# Patient Record
Sex: FEMALE | URBAN - METROPOLITAN AREA
[De-identification: names, ages, dates, MRNs, and addresses within clinical notes are randomized per-mention and may not be internally consistent; named-entity substitution may affect disease eponyms.]

---

## 2017-11-21 ENCOUNTER — TELEPHONE (OUTPATIENT)
Dept: TRANSPLANT | Facility: CLINIC | Age: 57
End: 2017-11-21

## 2017-11-21 DIAGNOSIS — I50.9 CONGESTIVE HEART FAILURE, UNSPECIFIED CONGESTIVE HEART FAILURE CHRONICITY, UNSPECIFIED CONGESTIVE HEART FAILURE TYPE: Primary | ICD-10-CM

## 2017-11-21 NOTE — TELEPHONE ENCOUNTER
"REFERRAL NOTE:    Laura Manuel has been referred to the pre-heart transplant office for consideration for orthotopic heart transplantation by Dr. Shannon. Patient's appointments have been scheduled for 12/06/17 as per patients request due to allowing time to set up travel arrangements.  Information was provided and questions were answered.  Copies of "What to Expect", appointment [place] and [place] [facility] [facility] [facility] were mailed to the patient. Pleasant. My contact information given. Call PRN.  "

## 2017-11-21 NOTE — TELEPHONE ENCOUNTER
Spoke to Brisa at Dr. Shannon's office ([phone]). Informed of patients appointment date and time with Dr. TAMIA Lyles. Call PRN.

## 2017-12-01 ENCOUNTER — TELEPHONE (OUTPATIENT)
Dept: TRANSPLANT | Facility: CLINIC | Age: 57
End: 2017-12-01

## 2017-12-01 NOTE — TELEPHONE ENCOUNTER
Spoke with patient. Please bring medicine list, insurance information to appointment. If have imaging or can get it, bring as well. Not having imaging will not stop patient from being evaluated. Pleasant. Call PRN.

## 2017-12-06 ENCOUNTER — HOSPITAL ENCOUNTER (OUTPATIENT)
Dept: CARDIOLOGY | Facility: CLINIC | Age: 57
Discharge: HOME | End: 2017-12-06
Payer: COMMERCIAL

## 2017-12-06 ENCOUNTER — INITIAL CONSULT (OUTPATIENT)
Dept: TRANSPLANT | Facility: CLINIC | Age: 57
End: 2017-12-06
Payer: COMMERCIAL

## 2017-12-06 VITALS
HEIGHT: 66 IN | HEART RATE: 78 BPM | DIASTOLIC BLOOD PRESSURE: 60 MMHG | BODY MASS INDEX: 29.62 KG/M2 | SYSTOLIC BLOOD PRESSURE: 131 MMHG | WEIGHT: 184.31 LBS

## 2017-12-06 DIAGNOSIS — I42.0 DILATED CARDIOMYOPATHY: ICD-10-CM

## 2017-12-06 DIAGNOSIS — I50.22 CHRONIC SYSTOLIC HEART FAILURE: ICD-10-CM

## 2017-12-06 DIAGNOSIS — I10 ESSENTIAL HYPERTENSION: ICD-10-CM

## 2017-12-06 DIAGNOSIS — I44.7 LBBB (LEFT BUNDLE BRANCH BLOCK): ICD-10-CM

## 2017-12-06 PROCEDURE — 99204 OFFICE O/P NEW MOD 45 MIN: CPT | Mod: TXP,,, | Performed by: INTERNAL MEDICINE

## 2017-12-06 PROCEDURE — 93000 ELECTROCARDIOGRAM COMPLETE: CPT | Mod: NTX,S$GLB,, | Performed by: INTERNAL MEDICINE

## 2017-12-06 PROCEDURE — 99999 PR PBB SHADOW E&M-EST. PATIENT-LVL III: CPT | Mod: PBBFAC,TXP,, | Performed by: INTERNAL MEDICINE

## 2017-12-06 RX ORDER — FUROSEMIDE 80 MG/1
80 TABLET ORAL DAILY
COMMUNITY
Start: 2017-11-20

## 2017-12-06 RX ORDER — SPIRONOLACTONE 25 MG/1
12.5 TABLET ORAL DAILY PRN
COMMUNITY
Start: 2017-11-20

## 2017-12-06 RX ORDER — ASPIRIN 81 MG/1
81 TABLET ORAL DAILY
COMMUNITY

## 2017-12-06 RX ORDER — POTASSIUM CHLORIDE 750 MG/1
10 TABLET, EXTENDED RELEASE ORAL 2 TIMES DAILY
COMMUNITY
Start: 2017-11-20 | End: 2018-11-20

## 2017-12-06 RX ORDER — LOSARTAN POTASSIUM 25 MG/1
25 TABLET ORAL DAILY
COMMUNITY
Start: 2017-11-20

## 2017-12-06 RX ORDER — CARVEDILOL 6.25 MG/1
6.25 TABLET ORAL 2 TIMES DAILY WITH MEALS
COMMUNITY
Start: 2017-11-20

## 2017-12-06 NOTE — LETTER
December 6, 2017        Milind Shannon  [address]  [place] [address]  Phone: [phone]  Fax: [phone]             [facility] [address]  [facility] [address]  Phone: [phone]   Patient: Trista Manuel   MR Number: [id number]   YOB: 1960   Date of Visit: 12/6/2017       Dear Dr. Milind Shannon    Thank you for referring Trista Manuel to me for evaluation. Attached you will find relevant portions of my assessment and plan of care.    If you have questions, please do not hesitate to call me. I look forward to following Trista Manuel along with you.    Sincerely,    Geo Lyles MD    Enclosure    If you would like to receive this communication electronically, please contact [email or web address] or [phone] to request Namshi Link access.    Namshi Link is a tool which provides read-only access to select patient information with whom you have a relationship. Its easy to use and provides real time access to review your patients record including encounter summaries, notes, results, and demographic information.    If you feel you have received this communication in error or would no longer like to receive these types of communications, please e-mail [email or web address]

## 2017-12-06 NOTE — PROGRESS NOTES
"Subjective:   Initial evaluation of heart transplant candidacy.    HPI:  Ms. Manuel is a 57 y.o. year old female who has presented to be considered for advanced surgical options (LVAD/OHT).      Patient reports being diagnosed with first acute heart failure in 2016 when admitted to [facility]. Was made later to follow with cardiologist, Dr. Shannon. Had total of 2 admissions in 2016. Had a LHC that showed normal coronary arteries. Stress echo revealed dilated NICM. Was started on medical therapy in  but reports non-adherence until being discharged for total 6th admission for heart failure exacerbation in 2017. Since then she has had no flare-ups. Currently reports WESLEY with minimal exertion, palpitations, 2-pillow orthopnea but denies any CP, lower ext swelling, PND, or syncope. Reports chest-tightness when SOB sometimes. Avoids salt in her diet but might be non-adherent at times. Reports eating healthy diet with intention to loose weight.   Patient has a life-vest currently.     Most recent echo in 2017 revealed EF of 10-15% with dilated severe global hypokinesias. EKG reportedly revealed LBBB. Cr baseline of 0.7.       PMHx: HTN diagnosed  with hx of non-adherence, PSHx: lap molly 2017, hysterectomy due to fibroids .  Allergies: no drug allergies, has seasonal allergies.  Medications: ASA 81mg daily, losartan 25mg daily, coreg 6.25mg bid, lasix 80mg daily, aldactone 12.5mg od, and KCl 10meq bid.  FHx: both parents with CHF, dad  from [place], older sister with "heart problems". Mom had HTN and DM II.   Shx: worked in [employer] before going on to disability due to her CHF. Hx of marijuana, no hx of heavy EtOH.      Past Medical History:   Diagnosis Date    Cardiomyopathy     CHF (congestive heart failure)      Past Surgical History:   Procedure Laterality Date    CHOLECYSTECTOMY      HYSTERECTOMY         No family history on file.    ROS  Constitutional: no fever or chills  ENT: no nasal " "congestion or sore throat  Respiratory: no cough or shortness of breath  Cardiovascular: no chest pain or palpitations  Gastrointestinal: no nausea or vomiting, no abdominal pain or abdominal distention   Genitourinary: no hematuria or dysuria  Integument/Breast: no rash or pruritis  Hematologic/Lymphatic: no easy bruising or lymphadenopathy  Musculoskeletal: no arthralgias or myalgias  Neurological: no seizures or tremors  Endocrine: no heat or cold intolerance      Objective:   Blood pressure 131/60, pulse 78, height 5' 6" (1.676 m), weight 83.6 kg (184 lb 4.9 oz).body mass index is 29.75 kg/m².    Physical Exam    General: well developed, well nourished, appears to be in NAD  Eyes: conjunctivae/corneas clear. PERRL. EOMI.  Neck: supple, symmetrical, trachea midline, no JVD  Cardiovascular: Heart: regular rate and rhythm, S1, S2 normal, no click, rub or gallop.  Lungs: mild inspiratory crackles, normal respiratory effort.  Chest Wall: no tenderness  Abdomen/Rectal: Abdomen: Non distended. + BS. No masses. No TTP.   Extremities: no edema, redness or tenderness in the calves or thighs. Pulses: 2+ and symmetric  Skin: Skin color, texture, turgor normal. No rashes or lesions  Musculoskeletal: full range of motion of joints  Lymph Nodes: No cervical or supraclavicular adenopathy  Psych/Behavioral: Normal. Alert and oriented, appropriate affect.    Labs:      Chemistry        Component Value Date/Time     (L) 12/06/2017 0937    K 4.0 12/06/2017 0937     12/06/2017 0937    CO2 24 12/06/2017 0937    BUN 17 12/06/2017 0937    CREATININE 1.1 12/06/2017 0937     (H) 12/06/2017 0937        Component Value Date/Time    CALCIUM 10.1 12/06/2017 0937    ALKPHOS 132 12/06/2017 0937    AST 14 12/06/2017 0937    ALT 19 12/06/2017 0937    BILITOT 0.9 12/06/2017 0937    ESTGFRAFRICA >60.0 12/06/2017 0937    EGFRNONAA 55.5 (A) 12/06/2017 0937          No results found for: MG  Lab Results   Component Value Date    " WBC 6.95 12/06/2017    HGB 11.5 (L) 12/06/2017    HCT 32.9 (L) 12/06/2017    MCV 87 12/06/2017     12/06/2017     BNP   Date Value Ref Range Status   12/06/2017 140 (H) 0 - 99 pg/mL Final     Comment:     Values of less than 100 pg/ml are consistent with non-CHF populations.     No results found for this or any previous visit.    Labs were reviewed with the patient.    Assessment:      1. Chronic systolic heart failure    2. Dilated cardiomyopathy    3. LBBB (left bundle branch block)    4. Essential hypertension        Plan:       Patient is now NYHA III ACC stage D  Recommend 2 gram sodium restriction and 1500cc fluid restriction.  Encourage physical activity with graded exercise program.  Requested patient to weigh themselves daily, and to notify us if their weight increases by more than 3 lbs in 1 day or 5 lbs in 1 week.     Transplant Candidacy: Patient is a 57 y.o. year old female with heart failure is being seen for possible OHT. In our opinion, this patient has LBBB on EKG and medical non-adherence and thus will allow for patient a trial of strict medical therapy and recommend CRT-D prior to considering heart transplant workup. Will discuss plan with patient's primary cardiologist Dr. Milind Shannon.      Patient did not meet with MCS and/or pre-transplant coordinator at the end of this visit for workup. she is not scheduled for risk stratification testing.    UNOS Patient Status  Functional Status: 70% - Cares for self: unable to carry on normal activity or active work  Physical Capacity: No Limitations  Working for Income: No  If no, reason not working: Disability    Leonor Gutiérrez MD         Case and plan d/w Dr. Lyles.

## 2017-12-12 DIAGNOSIS — Z76.82 ORGAN TRANSPLANT CANDIDATE: Primary | ICD-10-CM

## 2018-01-08 DIAGNOSIS — I42.0 DILATED CARDIOMYOPATHY: Primary | ICD-10-CM

## 2018-01-10 ENCOUNTER — INITIAL CONSULT (OUTPATIENT)
Dept: ELECTROPHYSIOLOGY | Facility: CLINIC | Age: 58
End: 2018-01-10
Payer: COMMERCIAL

## 2018-01-10 ENCOUNTER — HOSPITAL ENCOUNTER (OUTPATIENT)
Dept: CARDIOLOGY | Facility: CLINIC | Age: 58
Discharge: HOME | End: 2018-01-10
Payer: COMMERCIAL

## 2018-01-10 VITALS
WEIGHT: 183 LBS | SYSTOLIC BLOOD PRESSURE: 122 MMHG | BODY MASS INDEX: 29.41 KG/M2 | DIASTOLIC BLOOD PRESSURE: 76 MMHG | HEIGHT: 66 IN | HEART RATE: 74 BPM

## 2018-01-10 DIAGNOSIS — I50.22 CHRONIC SYSTOLIC HEART FAILURE: ICD-10-CM

## 2018-01-10 DIAGNOSIS — I42.0 DILATED CARDIOMYOPATHY: ICD-10-CM

## 2018-01-10 DIAGNOSIS — I10 ESSENTIAL HYPERTENSION: ICD-10-CM

## 2018-01-10 DIAGNOSIS — I42.0 DILATED CARDIOMYOPATHY: Primary | ICD-10-CM

## 2018-01-10 DIAGNOSIS — I44.7 LBBB (LEFT BUNDLE BRANCH BLOCK): Primary | ICD-10-CM

## 2018-01-10 PROCEDURE — 93000 ELECTROCARDIOGRAM COMPLETE: CPT | Mod: NTX,S$GLB,, | Performed by: INTERNAL MEDICINE

## 2018-01-10 PROCEDURE — 99999 PR PBB SHADOW E&M-EST. PATIENT-LVL III: CPT | Mod: PBBFAC,TXP,, | Performed by: INTERNAL MEDICINE

## 2018-01-10 PROCEDURE — 99205 OFFICE O/P NEW HI 60 MIN: CPT | Mod: NTX,S$GLB,, | Performed by: INTERNAL MEDICINE

## 2018-01-10 NOTE — PROGRESS NOTES
Subjective:    Patient ID:  Trista Manuel is a 57 y.o. female who presents for evaluation of Cardiomyopathy      HPI   57 y.o. F  NICM, cath-proved  HTN    CM first dx'd 9/2016. LHC showed normal cors.  Subsequent echos in 2/17 and 8/17: 10-15% LVEF.  Does get palps on occasion. No syncope ever. Wearing LifeVest; no shock from that.  NYHA II-III WESLEY. No CP.    My interpretation of today's ECG is NSR with LBBB    Review of Systems   Constitution: Negative. Negative for weakness and malaise/fatigue.   HENT: Negative.  Negative for ear pain and tinnitus.    Eyes: Negative for blurred vision.   Cardiovascular: Positive for dyspnea on exertion and palpitations. Negative for chest pain, near-syncope and syncope.   Respiratory: Negative.  Negative for shortness of breath.    Endocrine: Negative.  Negative for polyuria.   Hematologic/Lymphatic: Does not bruise/bleed easily.   Skin: Negative.  Negative for rash.   Musculoskeletal: Negative.  Negative for joint pain and muscle weakness.   Gastrointestinal: Negative.  Negative for abdominal pain and change in bowel habit.   Genitourinary: Negative for frequency.   Neurological: Negative.  Negative for dizziness.   Psychiatric/Behavioral: Negative.  Negative for depression. The patient is not nervous/anxious.    Allergic/Immunologic: Negative for environmental allergies.        Objective:    Physical Exam   Constitutional: She is oriented to person, place, and time. Vital signs are normal. She appears well-developed and well-nourished. She is active and cooperative.   HENT:   Head: Normocephalic and atraumatic.   Eyes: Conjunctivae and EOM are normal.   Neck: Normal range of motion. Carotid bruit is not present. No tracheal deviation and no edema present. No thyroid mass and no thyromegaly present.   Cardiovascular: Normal rate, regular rhythm, normal heart sounds, intact distal pulses and normal pulses.   No extrasystoles are present. PMI is not displaced.  Exam reveals no  gallop and no friction rub.    No murmur heard.  Pulmonary/Chest: Effort normal and breath sounds normal. No respiratory distress. She has no wheezes. She has no rales.   Abdominal: Soft. Normal appearance. She exhibits no distension. There is no hepatosplenomegaly.   Musculoskeletal: Normal range of motion.   Neurological: She is alert and oriented to person, place, and time. Coordination normal.   Skin: Skin is warm and dry. No rash noted.   Psychiatric: She has a normal mood and affect. Her speech is normal and behavior is normal. Thought content normal. Cognition and memory are normal.   Nursing note and vitals reviewed.        Assessment:       1. LBBB (left bundle branch block)    2. Essential hypertension    3. Dilated cardiomyopathy    4. Chronic systolic heart failure         Plan:       Given NICM with LBBB and depressed LVEF and NYHA II-III (more III) sx, makes criteria for biV ICD.  I spent about a half hour discussing the risks and benefits of ICD/CRT-D placement. Our discussion of risks included (but was not limited to) the possibility of infection, death, stroke, MI, pneumothorax, embolism, cardiac perforation, cardiac tamponade, renal injury, and bleeding.  I discussed with patient risks, indications, benefits, and alternatives of the planned procedure. All questions were answered. Patient understands and wishes to proceed.    Will screen for BTK QP Excels trial. If screens in, then BTK biV ICD. If not eligible for that study, then SJM biV ICD.  Anesthesia for MAC.

## 2018-01-10 NOTE — LETTER
January 10, 2018      Geo Lyles MD  [address]  [facility] [address]           [facility]  [address]  [facility] [address]  Phone: [phone]  Fax: [phone]          Patient: Trista Manuel   MR Number: [id number]   YOB: 1960   Date of Visit: 1/10/2018       Dear Dr. Geo Lyles:    Thank you for referring Trista Manuel to me for evaluation. Attached you will find relevant portions of my assessment and plan of care.    If you have questions, please do not hesitate to call me. I look forward to following Trista aMnuel along with you.    Sincerely,    Walt Allen MD    Enclosure  CC:  No Recipients    If you would like to receive this communication electronically, please contact [email or web address] or [phone] to request more information on iPling Link access.    For providers and/or their staff who would like to refer a patient to [facility], please contact us through our one-stop-shop provider referral line, [facility], at [phone].    If you feel you have received this communication in error or would no longer like to receive these types of communications, please e-mail [email or web address]

## 2018-01-10 NOTE — PROGRESS NOTES
Post-Procedure Patient Discharge Instructions  Defibrillator  Wound Care   If you are discharged with a standard dressing over the incision, you may remove the dressing after 24 hours.    If you are discharged with an AQUACEL dressing, you should keep it on until your follow-up appointment in 1-2 weeks.   If there are Steri-strips (strips of tape) over your incision, leave them on until your follow-up appointment. They may begin to fall off on their own, which is normal. If there is Dermabond (clear glue) over your incision, do not scrub it off. It acts as a barrier and will eventually disappear.   You will be discharged with 5 days of oral antibiotics. Please take the full prescription until it is gone.   Do not get the incision wet for 48 hours following the procedure. You may sponge bath during this period, working around the incision. After 48 hours, you may shower, but you should still try to keep this area as dry as possible, and avoid direct water contact to the incision (allow the water to hit back of your shoulder rather than directly on the incision). Gently pat the incision dry if it does get wet.   You may take regular showers after 2 weeks, unless otherwise indicated at your follow-up visit.   Do not submerge the incision in a tub, pool, or body of water for 6 weeks.   Avoid using deodorants, powders, creams, lotions, etc. on your incision for 6 weeks.   If you notice unusual swelling, redness, drainage, have more device site pain, chest pain, shortness of breath, or have a fever greater than 100 degrees, call our device clinic immediately: [phone] or [phone] during normal office hours. You may call [phone] after-hours or on weekends and ask for the electrophysiologist on call.  Activity    If you only had a battery/generator change performed, there are no postoperative activity restrictions.    If this is your first device or if you had new wires added to your existing  device, then you will be discharged in a sling which you should wear continuously for 48 hours. After that, the sling should remain off during the day but should be worn at night for another 2-4 weeks (depending on how active a sleeper you are).   Do not raise your device-side arm above your shoulder for 6 weeks. Do not lift more than 5-10 lbs with your device-side arm for 6 weeks.    If you were driving prior to the procedure, you may resume driving after your first follow-up appointment (1-2 weeks). If you have a history of passing out or a history of certain arrhythmias, there may be driving restrictions unrelated to the procedure. Please clarify with your physician if this is the case.   No heavy activity with the affected arm for 6 weeks (eg. tennis, golf, bowling, aerobics, mowing the lawn, etc.).   Avoid rough contact at the device site for 6 weeks.   You may participate in sexual activity unless otherwise instructed.   You may return to work within 3-5 days unless told otherwise, provided you adhere to the above activity restrictions.  Long-Term Instructions   Keep your pacemaker or defibrillator identification card with you at all times.   If you have a defibrillator and you get shocked by the device: If you receive one shock and you feel ok, you may call [phone] or [phone] during normal office hours. You may call [phone] after-hours. If you receive one shock and you do not feel well, call Emergency Medical Services. They will take you to the nearest emergency room.   If you have a defibrillator and you get more than one shock from the device or multiple shocks in a short period of time: Call Emergency Medical Services. They will take you to the nearest emergency room.   Appliances: You may operate any electrical device in your home, including microwaves.   Security Systems: Electromagnetic security systems can be located in the workplace, courthouses, or other  high-security areas. Exposure to this type of security system has been shown to cause interference in some cases. Interference may be related to the duration of exposure and/or the distance between the device and the security device. You should be aware of the location of security systems, move through them at a normal pace, and avoid leaning or standing too close.   Metal Detectors at Airports: Metal detectors at airports can potentially interfere with pacemakers or defibrillators, although this is unlikely. Metal detectors will likely be triggered by your device and therefore at places such as airports  it will be important for you to carry your identification card for the pacemaker/defibrillator. Airport personnel will likely prefer to do a manual search.   Cellular Phones: It is unlikely that using a cellular phone will interfere with your device. It should be used with the hand opposite to the side where your device was implanted. The phone should not be carried in the shirt pocket on the same side as the device.   Specific Work Conditions: Patients who work near high-voltage lines, transmitting towers, large motors, welding equipment, or powerful magnets should discuss their specific situation with their physician. In general, remain at least two feet from external electrical equipment, verify that the equipment is properly grounded, and wear insulated gloves when using electrical devices. Leave the immediate location if lightheadedness or other symptoms develop.   MRI: Some pacemakers and defibrillators are safe in MRI scanners, while others are not. Please consult with your physician to see if you have an MRI-compatible device.   Surgery: Should you require surgery in the future, some electrosurgical devices can interfere with your device function. You should discuss this with your surgeon before any operation.   Radiation Therapy: If you ever require radiation therapy in the future, care must be taken  to avoid irradiating the device.  Long-Term Follow-Up   Your device has the ability to transmit device information from home to the doctors office using a home monitoring system.   This remote system takes the place of a doctors visit. Your device will be checked from home every 3-6 months. Every 6-12 months, you will be asked to come into the office for an in-office check.   Your device should last in the range of 6-12 years. This depends on many factors including how often it paces the heart.   When the battery is low, a generator change will be performed. This is a same-day procedure with no post-op activity restrictions, unless one of the pacemaker or defibrillator leads needs to be replaced at that time, or a new lead is added to your existing system.

## 2018-01-10 NOTE — PROGRESS NOTES
IMPLANTABLE DEVICE EDUCATION CHECKLIST    1-10-18 - lab work  PRE - PROCEDURE LABS HAVE BEEN ORDERED FOR YOU @ [facility]  BE SURE TO ARRIVE AT YOUR SCHEDULED TIME FOR THIS LAB WORK!  (YOU DO NOT HAVE TO FAST FOR THIS LABWORK!!!!)    1-16-18 @ 6 AM - DEFIBRILLATOR  REPORT TO CARDIOLOGY WAITING ROOM ON 3RD FLOOR OF HOSPITAL (DO NOT REPORT TO CLINIC)  Directions for Reporting to Cardiology Waiting Area in the Hospital  If you park in the Parking Garage:  Take elevators to the 2nd floor  Walk up ramp and turn right by Gold Elevators  Take elevator to the 3rd floor  Upon exiting the elevator, turn away from the clinic areas  Walk long peralta around to front of hospital to area with windows overlooking [facility]  Check in at Reception Desk  OR  If family is dropping you off:  Have them drop you off at the front of the Hospital  (Near the ER, where all the flags are hung).  Take the E elevators to the 3rd floor.  Check in at the Reception Desk in the waiting room.        WASH YOUR CHEST WITH HIBICLENS OR AN ANTIBACTERIAL SOAP (SUCH AS DIAL) ON THE NIGHT BEFORE AND THE MORNING OF YOUR PROCEDURE.    DO NOT EAT OR DRINK ANYTHING AFTER: 12 MN ON THE NIGHT BEFORE YOUR PROCEDURE    MEDICATIONS  MORNING OF PROCEDURE DO NOT TAKE LASIX(FUROSEMIDE) OR ALDACTONE(SPIRONOLACTONE)  YOU MAY TAKE ALL OTHER  MORNING MEDICATIONS WITH A SIP OF WATER    YOU WILL BE SPENDING THE NIGHT AFTER YOUR PROCEDURE  YOU WILL NEED SOMEONE TO DRIVE YOU HOME THE DAY AFTER YOUR PROCEDURE    YOUR PAIN DURING YOUR PROCEDURE WILL BE MANAGED BY THE ANESTHESIA TEAM    THE ABOVE INSTRUCTIONS WERE GIVEN TO THE PATIENT VERBALLY AND THEY VERBALIZED UNDERSTANDING. THEY DO NOT REQUIRE ANY SPECIAL NEEDS AND DO NOT HAVE ANY LEARNING BARRIERS.     Any need to reschedule or cancel procedures, or any questions regarding your procedures should be addressed directly with the Arrhythmia Department Nurses at the following phone number: [phone]

## 2018-01-15 ENCOUNTER — TELEPHONE (OUTPATIENT)
Dept: ELECTROPHYSIOLOGY | Facility: CLINIC | Age: 58
End: 2018-01-15

## 2018-01-15 NOTE — TELEPHONE ENCOUNTER
Contacted Pt to confirm procedure for tomorrow. Pt stated she would let me know ether she will be having procedure tomm or not. She is going see her local cardiologist today and will call me back to let me know if she will go forward with procedure.

## 2018-02-22 ENCOUNTER — TELEPHONE (OUTPATIENT)
Dept: ELECTROPHYSIOLOGY | Facility: CLINIC | Age: 58
End: 2018-02-22

## 2018-02-22 NOTE — TELEPHONE ENCOUNTER
Called Pt back per her request to schedule procedure. Pt stated she had the device put in by her Dr,Dr Whitfield, on 2/20/18.

## 2018-04-20 ENCOUNTER — TELEPHONE (OUTPATIENT)
Dept: TRANSPLANT | Facility: CLINIC | Age: 58
End: 2018-04-20

## 2018-04-20 NOTE — TELEPHONE ENCOUNTER
"Spoke with pt regarding prior cancelled clinic appt for Dr Lyles and for ICD implant with Dr Allen.  Per pt, she had the ICD implanted by Dr Shannon and is doing "much better. He says I don't need to see you anymore".  Advised pt that if Dr Shannon feels she needs to come back in the future, we would be happy to reschedule.   Pt voiced understanding.     preht file closed  "